# Patient Record
Sex: MALE | Race: WHITE
[De-identification: names, ages, dates, MRNs, and addresses within clinical notes are randomized per-mention and may not be internally consistent; named-entity substitution may affect disease eponyms.]

---

## 2023-04-19 ENCOUNTER — HOSPITAL ENCOUNTER (INPATIENT)
Dept: HOSPITAL 95 - ER | Age: 63
LOS: 5 days | Discharge: HOME | DRG: 896 | End: 2023-04-24
Attending: HOSPITALIST | Admitting: HOSPITALIST
Payer: COMMERCIAL

## 2023-04-19 VITALS — BODY MASS INDEX: 22.32 KG/M2 | WEIGHT: 173.94 LBS | HEIGHT: 74 IN

## 2023-04-19 DIAGNOSIS — I10: ICD-10-CM

## 2023-04-19 DIAGNOSIS — K74.60: ICD-10-CM

## 2023-04-19 DIAGNOSIS — T17.908A: ICD-10-CM

## 2023-04-19 DIAGNOSIS — K70.10: ICD-10-CM

## 2023-04-19 DIAGNOSIS — S52.612A: ICD-10-CM

## 2023-04-19 DIAGNOSIS — Z71.41: ICD-10-CM

## 2023-04-19 DIAGNOSIS — F10.239: Primary | ICD-10-CM

## 2023-04-19 DIAGNOSIS — R29.6: ICD-10-CM

## 2023-04-19 DIAGNOSIS — R56.9: ICD-10-CM

## 2023-04-19 DIAGNOSIS — S10.83XA: ICD-10-CM

## 2023-04-19 DIAGNOSIS — S60.221A: ICD-10-CM

## 2023-04-19 DIAGNOSIS — K04.7: ICD-10-CM

## 2023-04-19 DIAGNOSIS — M97.31XA: ICD-10-CM

## 2023-04-19 DIAGNOSIS — R04.0: ICD-10-CM

## 2023-04-19 DIAGNOSIS — S52.502A: ICD-10-CM

## 2023-04-19 DIAGNOSIS — F17.210: ICD-10-CM

## 2023-04-19 DIAGNOSIS — G92.8: ICD-10-CM

## 2023-04-19 DIAGNOSIS — A41.50: ICD-10-CM
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
## 2023-04-19 DIAGNOSIS — D72.819: ICD-10-CM
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
## 2023-04-19 DIAGNOSIS — S60.222A: ICD-10-CM

## 2023-04-19 DIAGNOSIS — R47.81: ICD-10-CM

## 2023-04-19 PROCEDURE — A9270 NON-COVERED ITEM OR SERVICE: HCPCS

## 2023-04-19 PROCEDURE — G0480 DRUG TEST DEF 1-7 CLASSES: HCPCS

## 2023-04-20 VITALS — DIASTOLIC BLOOD PRESSURE: 75 MMHG | SYSTOLIC BLOOD PRESSURE: 130 MMHG

## 2023-04-20 VITALS — SYSTOLIC BLOOD PRESSURE: 123 MMHG | DIASTOLIC BLOOD PRESSURE: 76 MMHG

## 2023-04-20 VITALS — DIASTOLIC BLOOD PRESSURE: 73 MMHG | SYSTOLIC BLOOD PRESSURE: 141 MMHG

## 2023-04-20 VITALS — DIASTOLIC BLOOD PRESSURE: 72 MMHG | SYSTOLIC BLOOD PRESSURE: 112 MMHG

## 2023-04-20 VITALS — SYSTOLIC BLOOD PRESSURE: 130 MMHG | DIASTOLIC BLOOD PRESSURE: 85 MMHG

## 2023-04-20 VITALS — DIASTOLIC BLOOD PRESSURE: 101 MMHG | SYSTOLIC BLOOD PRESSURE: 114 MMHG

## 2023-04-20 VITALS — SYSTOLIC BLOOD PRESSURE: 138 MMHG | DIASTOLIC BLOOD PRESSURE: 80 MMHG

## 2023-04-20 VITALS — SYSTOLIC BLOOD PRESSURE: 108 MMHG | DIASTOLIC BLOOD PRESSURE: 69 MMHG

## 2023-04-20 VITALS — SYSTOLIC BLOOD PRESSURE: 107 MMHG | DIASTOLIC BLOOD PRESSURE: 72 MMHG

## 2023-04-20 VITALS — SYSTOLIC BLOOD PRESSURE: 109 MMHG | DIASTOLIC BLOOD PRESSURE: 67 MMHG

## 2023-04-20 VITALS — SYSTOLIC BLOOD PRESSURE: 118 MMHG | DIASTOLIC BLOOD PRESSURE: 69 MMHG

## 2023-04-20 VITALS — SYSTOLIC BLOOD PRESSURE: 119 MMHG | DIASTOLIC BLOOD PRESSURE: 65 MMHG

## 2023-04-20 VITALS — SYSTOLIC BLOOD PRESSURE: 112 MMHG | DIASTOLIC BLOOD PRESSURE: 72 MMHG

## 2023-04-20 VITALS — DIASTOLIC BLOOD PRESSURE: 81 MMHG | SYSTOLIC BLOOD PRESSURE: 132 MMHG

## 2023-04-20 VITALS — DIASTOLIC BLOOD PRESSURE: 71 MMHG | SYSTOLIC BLOOD PRESSURE: 129 MMHG

## 2023-04-20 VITALS — DIASTOLIC BLOOD PRESSURE: 84 MMHG | SYSTOLIC BLOOD PRESSURE: 135 MMHG

## 2023-04-20 VITALS — SYSTOLIC BLOOD PRESSURE: 104 MMHG | DIASTOLIC BLOOD PRESSURE: 75 MMHG

## 2023-04-20 VITALS — DIASTOLIC BLOOD PRESSURE: 81 MMHG | SYSTOLIC BLOOD PRESSURE: 127 MMHG

## 2023-04-20 VITALS — DIASTOLIC BLOOD PRESSURE: 69 MMHG | SYSTOLIC BLOOD PRESSURE: 140 MMHG

## 2023-04-20 VITALS — DIASTOLIC BLOOD PRESSURE: 64 MMHG | SYSTOLIC BLOOD PRESSURE: 117 MMHG

## 2023-04-20 VITALS — DIASTOLIC BLOOD PRESSURE: 66 MMHG | SYSTOLIC BLOOD PRESSURE: 109 MMHG

## 2023-04-20 VITALS — SYSTOLIC BLOOD PRESSURE: 102 MMHG | DIASTOLIC BLOOD PRESSURE: 62 MMHG

## 2023-04-20 VITALS — SYSTOLIC BLOOD PRESSURE: 105 MMHG | DIASTOLIC BLOOD PRESSURE: 67 MMHG

## 2023-04-20 VITALS — DIASTOLIC BLOOD PRESSURE: 88 MMHG | SYSTOLIC BLOOD PRESSURE: 157 MMHG

## 2023-04-20 VITALS — SYSTOLIC BLOOD PRESSURE: 132 MMHG | DIASTOLIC BLOOD PRESSURE: 79 MMHG

## 2023-04-20 VITALS — SYSTOLIC BLOOD PRESSURE: 137 MMHG | DIASTOLIC BLOOD PRESSURE: 72 MMHG

## 2023-04-20 VITALS — DIASTOLIC BLOOD PRESSURE: 75 MMHG | SYSTOLIC BLOOD PRESSURE: 132 MMHG

## 2023-04-20 LAB
ALBUMIN SERPL BCP-MCNC: 3.1 G/DL (ref 3.4–5)
ALBUMIN/GLOB SERPL: 0.7 {RATIO} (ref 0.8–1.8)
ALT SERPL W P-5'-P-CCNC: 636 U/L (ref 12–78)
ANION GAP SERPL CALCULATED.4IONS-SCNC: 4 MMOL/L (ref 6–16)
AST SERPL W P-5'-P-CCNC: 1810 U/L (ref 12–37)
BARBITURATES UR SCN-MCNC: DETECTED NG/ML
BASOPHILS # BLD: 0.08 K/MM3 (ref 0–0.23)
BASOPHILS NFR BLD: 1 % (ref 0–2)
BENZODIAZ UR-MCNC: DETECTED UG/L
BILIRUB SERPL-MCNC: 1.4 MG/DL (ref 0.1–1)
BUN SERPL-MCNC: 12 MG/DL (ref 8–24)
CALCIUM SERPL-MCNC: 8.8 MG/DL (ref 8.5–10.1)
CHLORIDE SERPL-SCNC: 103 MMOL/L (ref 98–108)
CK SERPL-CCNC: 145 U/L (ref 39–308)
CO2 SERPL-SCNC: 25 MMOL/L (ref 21–32)
CREAT SERPL-MCNC: 0.52 MG/DL (ref 0.6–1.2)
DEPRECATED RDW RBC AUTO: 59 FL (ref 35.1–46.3)
EOSINOPHIL # BLD: 0 K/MM3 (ref 0–0.68)
EOSINOPHIL NFR BLD: 0 % (ref 0–6)
ERYTHROCYTE [DISTWIDTH] IN BLOOD BY AUTOMATED COUNT: 14.9 % (ref 11.7–14.2)
ETHANOL SERPL-MCNC: <3 MG/DL
GLOBULIN SER CALC-MCNC: 4.5 G/DL (ref 2.2–4)
GLUCOSE SERPL-MCNC: 99 MG/DL (ref 70–99)
HCT VFR BLD AUTO: 30.9 % (ref 37–53)
HGB BLD-MCNC: 10.3 G/DL (ref 13.5–17.5)
LYMPHOCYTES # BLD: 0.41 K/MM3 (ref 0.84–5.2)
LYMPHOCYTES NFR BLD: 5 % (ref 21–46)
MAGNESIUM SERPL-MCNC: 1.8 MG/DL (ref 1.6–2.4)
MCHC RBC AUTO-ENTMCNC: 33.3 G/DL (ref 31.5–36.5)
MCV RBC AUTO: 109 FL (ref 80–100)
MONOCYTES # BLD: 0.08 K/MM3 (ref 0.16–1.47)
MONOCYTES NFR BLD: 1 % (ref 4–13)
NEUTS SEG # BLD MANUAL: 7.69 K/MM3 (ref 1.96–9.15)
NEUTS SEG NFR BLD MANUAL: 93 % (ref 41–73)
NRBC # BLD AUTO: 0 K/MM3 (ref 0–0.02)
NRBC BLD AUTO-RTO: 0 /100 WBC (ref 0–0.2)
OXYCODONE UR-MCNC: DETECTED NG/ML
PLATELET # BLD AUTO: 110 K/MM3 (ref 150–400)
POTASSIUM SERPL-SCNC: 4.1 MMOL/L (ref 3.5–5.5)
PROT SERPL-MCNC: 7.6 G/DL (ref 6.4–8.2)
PROT UR STRIP-MCNC: (no result) MG/DL
PROTHROMBIN TIME: 11 SEC (ref 9.7–11.5)
RBC #/AREA URNS HPF: (no result) /HPF (ref 0–2)
SODIUM SERPL-SCNC: 132 MMOL/L (ref 136–145)
SP GR SPEC: 1.01 (ref 1–1.02)
TOTAL CELLS COUNTED BLD: 100
UROBILINOGEN UR STRIP-MCNC: (no result) MG/DL

## 2023-04-20 PROCEDURE — 2W3AX1Z IMMOBILIZATION OF RIGHT UPPER ARM USING SPLINT: ICD-10-PCS | Performed by: EMERGENCY MEDICINE

## 2023-04-20 PROCEDURE — 2W3FX1Z IMMOBILIZATION OF LEFT HAND USING SPLINT: ICD-10-PCS | Performed by: EMERGENCY MEDICINE

## 2023-04-20 PROCEDURE — 3E03329 INTRODUCTION OF OTHER ANTI-INFECTIVE INTO PERIPHERAL VEIN, PERCUTANEOUS APPROACH: ICD-10-PCS | Performed by: HOSPITALIST

## 2023-04-20 PROCEDURE — HZ2ZZZZ DETOXIFICATION SERVICES FOR SUBSTANCE ABUSE TREATMENT: ICD-10-PCS | Performed by: HOSPITALIST

## 2023-04-20 NOTE — NUR
SHIFT SUMMARY
PT A&OX 3. FOLLOWS COMMANDS. CONTINUES TO HAVE GARBLED SPEECH THAT IS
DIFFICULT TO UNDERSTAND BUT ANSWERS QUESTIONS APPROPRIATELY. PRECEDEX D/C'D.
CIWA 5-8 THIS SHIFT. ORTHO CONSULT COMPLETE. SLING PLACED TO RIGHT ARM.
TOLERATING DIET WELL. WILL CONTINUE TO MONITOR UNTIL REPORT TO ONCOMING NURSE.

## 2023-04-20 NOTE — NUR
ARRIVAL TO ICU/END OF SHIFT SUMMARY
PT ARRIVED TO ICU 11 AT 0405 VIA ED BED AND TRANSFERED OVER TO ICU BED VIA
SLIDE SHEET. HERE IN ICU D/T FALLING AT ADAPT WHILE GOING THROUGH ETOH
WITHDRAWLS AND IS ON A PRECEDEX GTT. HE IS REACTIVE TO VERBAL STIMULI AND
ORIENTED TO SELF; SLURRED SPEECH AND MUMBLES NOTED; CANNOT HOLD A
CONVERSATIONS WITH PT; CIWA 16; RESTLESS AT TIMES, PRN ATIVAN GIVEN ONCE;
PRECEDEX INFUSING AT 0.2MCG/KG/HR. SPO2 >98% ON RA. TEMP ON ARRIVAL 101.9, FAN
PLACED, TEMP NOW 99.8. HR . BP STABLE, 'S. CONDOM CATH PLACED D/T
EPISODES OF INCONTINENCE; UA SENT. D/T FALLS, PT HAS A VARIETY OF BRUISES AT
VARIES HEALING STAGES; BRUISES NOTED ON RUE, STERNUM, CHEST, RT ABD/TRUNK,
CHIN, NECK, LT EYE, NOSE (WHICH IS ALSO BROKEN) AND LT WRIST. LT WRIST IS
BROKEN AND IN SOFT SPLINT FROM ED. RT SHOULDER IS VERY SWOLLEN AND BRUISED,
SCAN SHOWED THE SHOULDER REFRACTURING. WILL REPORT TO AM RN WHEN AVAILABLE.

## 2023-04-20 NOTE — NUR
ASSUMED CARE
BEDSIDE REPORT FROM LAURA GONZALEZ AT 0700. PT RESTING IN BED. PRECEDEX GTT
INFUSING FOR ETOH W/D. PT WAKES c VERBAL STIMULI. MUMBLES INCOHERANTLY. UNABLE
TO ANSWER QUESTIONS. FOLLOWS SIMPLE COMMANDS. RETURNS TO SLEEP WHEN
UNDISTURBED. SR, RATE 70-80'S ON MONITOR. BP STABLE. LUNGS DIM IN BASES, RA,
O2 SATS >94%. SNORING RESP. ABD ROUND, SOFT, NON TENDER. BT X 4. CONDOM CATH
IN PLACE D/T INCONTINENCE. PT HAS SIGNIFICANT BRUISING TO RIGHT SHOULDER,
RIGHT TRUNK/HIP, BILATERAL EYES, RIGHT JAW, AND STERNUM. SEE PHOTOS IN CHART.
U/S AT BEDSIDE. WILL CONTINUE TO MONITOR.

## 2023-04-20 NOTE — NUR
Pt. is resting but responds when I enter the room. Pt. is intially guarded,
and displays evidence of being somnolent.  Attempt to build rapport. Pt.
requests a Rosary.  Prayed with Pt. and then provided him with a rosary. Pt.
verbalized gratitude for the visit and welcomed this  to return.

## 2023-04-21 VITALS — DIASTOLIC BLOOD PRESSURE: 62 MMHG | SYSTOLIC BLOOD PRESSURE: 101 MMHG

## 2023-04-21 VITALS — DIASTOLIC BLOOD PRESSURE: 71 MMHG | SYSTOLIC BLOOD PRESSURE: 118 MMHG

## 2023-04-21 VITALS — SYSTOLIC BLOOD PRESSURE: 114 MMHG | DIASTOLIC BLOOD PRESSURE: 60 MMHG

## 2023-04-21 VITALS — SYSTOLIC BLOOD PRESSURE: 116 MMHG | DIASTOLIC BLOOD PRESSURE: 71 MMHG

## 2023-04-21 VITALS — SYSTOLIC BLOOD PRESSURE: 100 MMHG | DIASTOLIC BLOOD PRESSURE: 62 MMHG

## 2023-04-21 VITALS — DIASTOLIC BLOOD PRESSURE: 75 MMHG | SYSTOLIC BLOOD PRESSURE: 133 MMHG

## 2023-04-21 VITALS — DIASTOLIC BLOOD PRESSURE: 85 MMHG | SYSTOLIC BLOOD PRESSURE: 100 MMHG

## 2023-04-21 VITALS — DIASTOLIC BLOOD PRESSURE: 72 MMHG | SYSTOLIC BLOOD PRESSURE: 108 MMHG

## 2023-04-21 VITALS — SYSTOLIC BLOOD PRESSURE: 109 MMHG | DIASTOLIC BLOOD PRESSURE: 60 MMHG

## 2023-04-21 VITALS — DIASTOLIC BLOOD PRESSURE: 77 MMHG | SYSTOLIC BLOOD PRESSURE: 120 MMHG

## 2023-04-21 VITALS — DIASTOLIC BLOOD PRESSURE: 69 MMHG | SYSTOLIC BLOOD PRESSURE: 112 MMHG

## 2023-04-21 LAB
ALBUMIN SERPL BCP-MCNC: 2.8 G/DL (ref 3.4–5)
ALBUMIN/GLOB SERPL: 0.7 {RATIO} (ref 0.8–1.8)
ALT SERPL W P-5'-P-CCNC: 338 U/L (ref 12–78)
ANION GAP SERPL CALCULATED.4IONS-SCNC: 5 MMOL/L (ref 6–16)
AST SERPL W P-5'-P-CCNC: 485 U/L (ref 12–37)
BILIRUB SERPL-MCNC: 0.7 MG/DL (ref 0.1–1)
BUN SERPL-MCNC: 10 MG/DL (ref 8–24)
CALCIUM SERPL-MCNC: 8 MG/DL (ref 8.5–10.1)
CHLORIDE SERPL-SCNC: 104 MMOL/L (ref 98–108)
CO2 SERPL-SCNC: 23 MMOL/L (ref 21–32)
CREAT SERPL-MCNC: 0.55 MG/DL (ref 0.6–1.2)
DEPRECATED RDW RBC AUTO: 56.6 FL (ref 35.1–46.3)
ERYTHROCYTE [DISTWIDTH] IN BLOOD BY AUTOMATED COUNT: 14.6 % (ref 11.7–14.2)
GLOBULIN SER CALC-MCNC: 3.9 G/DL (ref 2.2–4)
GLUCOSE SERPL-MCNC: 105 MG/DL (ref 70–99)
HCT VFR BLD AUTO: 27.6 % (ref 37–53)
HGB BLD-MCNC: 9.2 G/DL (ref 13.5–17.5)
MAGNESIUM SERPL-MCNC: 1.9 MG/DL (ref 1.6–2.4)
MCHC RBC AUTO-ENTMCNC: 33.3 G/DL (ref 31.5–36.5)
MCV RBC AUTO: 108 FL (ref 80–100)
NRBC # BLD AUTO: 0 K/MM3 (ref 0–0.02)
NRBC BLD AUTO-RTO: 0 /100 WBC (ref 0–0.2)
PHOSPHATE SERPL-MCNC: 3.1 MG/DL (ref 2.5–4.9)
PLATELET # BLD AUTO: 75 K/MM3 (ref 150–400)
POTASSIUM SERPL-SCNC: 3.9 MMOL/L (ref 3.5–5.5)
PROT SERPL-MCNC: 6.7 G/DL (ref 6.4–8.2)
SODIUM SERPL-SCNC: 132 MMOL/L (ref 136–145)

## 2023-04-21 NOTE — NUR
Pt. is resting but responds when I enter the room. Pt. is pleasant and
verbalizes gratitude for the rosary that he received yesterday. Re-established
rapport. Pt. displayed evidence of somnolence, but displayed an agreeable
spirit. Prayed with Pt. Pt. verbalized gratitude for the spiritual care visit.

## 2023-04-21 NOTE — NUR
SHIFT SUMMARY
 
PT CONTINUES TO REST IN BED. UP TO BEDSIDE COMODE WITH WALKER AND ASSISTANCE.
OCCASIONALLY INCONTINENT, ATTENDS IN PLACE. PT ANSWERS MOST QUESTIONS
APPROPRIATELY AND FOLLOWS COMMANDS. PT COMPLAINS OF PAIN, MEDICATED PER EMAR.
BRUISING TO RIGHT SHOULDER, ARM, CHIN AND NECK REMAINS UNCHANGED. SLING
REMAINS IN PLACE ON RIGHT ARM, SOFT SPLINT REMAINS IN PLACE ON LEFT WRIST.
HR 70-90'S, NSR, BP STABLE. NO ACUTE EVENTS THIS SHIFT. WILL CONTINUE TO
MONITOR AND GIVE REPORT TO ONCOMING RN.

## 2023-04-21 NOTE — NUR
ASSUMED CARE. PT MORE ALERT AND ORIENTED TODAY. FOLLOWING COMMANDS. ABLE TO
USE CALL LIGHT AND MAKE NEEDS KNOWN. CROSSROADS DROPPED OFF HIS BELONGINGS. HE
WAS SEARCHING FOR HIS HEARING AIDS, THEY WERE NOT IN HIS BELONGINGS. STATES
PAIN IS 9/10 IN SHOULDER. SWELLING GONE DOWN IN LIP AND CHIN, ABLE TO SPEAK
MORE CLEARLY. GOOD APPETITE, BM TODAY PER PATIENT. URINAL EMPTIED. DENIES ANY
NEEDS. SEE ASSESSMENT FOR FURTHER DETAILS.

## 2023-04-22 VITALS — DIASTOLIC BLOOD PRESSURE: 86 MMHG | SYSTOLIC BLOOD PRESSURE: 133 MMHG

## 2023-04-22 VITALS — SYSTOLIC BLOOD PRESSURE: 139 MMHG | DIASTOLIC BLOOD PRESSURE: 79 MMHG

## 2023-04-22 VITALS — SYSTOLIC BLOOD PRESSURE: 126 MMHG | DIASTOLIC BLOOD PRESSURE: 83 MMHG

## 2023-04-22 VITALS — SYSTOLIC BLOOD PRESSURE: 140 MMHG | DIASTOLIC BLOOD PRESSURE: 83 MMHG

## 2023-04-22 VITALS — DIASTOLIC BLOOD PRESSURE: 78 MMHG | SYSTOLIC BLOOD PRESSURE: 110 MMHG

## 2023-04-22 LAB
ANION GAP SERPL CALCULATED.4IONS-SCNC: 2 MMOL/L (ref 6–16)
BUN SERPL-MCNC: 11 MG/DL (ref 8–24)
CALCIUM SERPL-MCNC: 8.1 MG/DL (ref 8.5–10.1)
CHLORIDE SERPL-SCNC: 104 MMOL/L (ref 98–108)
CO2 SERPL-SCNC: 26 MMOL/L (ref 21–32)
CREAT SERPL-MCNC: 0.63 MG/DL (ref 0.6–1.2)
DEPRECATED RDW RBC AUTO: 56.6 FL (ref 35.1–46.3)
ERYTHROCYTE [DISTWIDTH] IN BLOOD BY AUTOMATED COUNT: 14.5 % (ref 11.7–14.2)
GLUCOSE SERPL-MCNC: 95 MG/DL (ref 70–99)
HCT VFR BLD AUTO: 28.8 % (ref 37–53)
HGB BLD-MCNC: 9.5 G/DL (ref 13.5–17.5)
MCHC RBC AUTO-ENTMCNC: 33 G/DL (ref 31.5–36.5)
MCV RBC AUTO: 109 FL (ref 80–100)
NRBC # BLD AUTO: 0 K/MM3 (ref 0–0.02)
NRBC BLD AUTO-RTO: 0 /100 WBC (ref 0–0.2)
PLATELET # BLD AUTO: 77 K/MM3 (ref 150–400)
POTASSIUM SERPL-SCNC: 3.6 MMOL/L (ref 3.5–5.5)
SODIUM SERPL-SCNC: 132 MMOL/L (ref 136–145)

## 2023-04-22 NOTE — NUR
PT IS A/OX3, PLEASANT AND COOPERATIVE. THE PT IS UP WITH MINIMAL ASSIST TO THE
CHAIR AND TO THE BATHROOM. PT WAS UP AND AMBULATED IN THE [facility] TODAY WITH THE
PHYSICAL THERAPIST. THE PT WAS MEDICATED FOR PAIN T/O THE DAY. PT APPEARS TO
BE BREATHING EASILY ON RA AT THIS TIME. THE PT HAS HAD FREQUANT EPTAXIA TODAY.
CALL LIGHT IN REACH. WILL CONTINUE TO MONITOR AND ASSESS FOR CHANGES.

## 2023-04-22 NOTE — NUR
PT TRANSFERED AFTER MD FROM ICU. PT PLEASANT, ORIENTED TO ROOM. PT ASKING
ABOUT PAIN MEDICATION AND AVAILABILTY. WENT OVER MAR WITH PT, EXPLAINING WHEN
MEDICATION IS AVAILABLE AND APPROPRIATE DOSING. PT HAD BLODDY NOSE WHILE
SLEEPING, CLOTTED ON OWN. PT STATES THIS IS NOT UNUSUAL.

## 2023-04-23 VITALS — DIASTOLIC BLOOD PRESSURE: 84 MMHG | SYSTOLIC BLOOD PRESSURE: 150 MMHG

## 2023-04-23 VITALS — DIASTOLIC BLOOD PRESSURE: 79 MMHG | SYSTOLIC BLOOD PRESSURE: 153 MMHG

## 2023-04-23 VITALS — DIASTOLIC BLOOD PRESSURE: 63 MMHG | SYSTOLIC BLOOD PRESSURE: 113 MMHG

## 2023-04-23 VITALS — SYSTOLIC BLOOD PRESSURE: 132 MMHG | DIASTOLIC BLOOD PRESSURE: 78 MMHG

## 2023-04-23 LAB
ANION GAP SERPL CALCULATED.4IONS-SCNC: 4 MMOL/L (ref 6–16)
BUN SERPL-MCNC: 11 MG/DL (ref 8–24)
CALCIUM SERPL-MCNC: 8.4 MG/DL (ref 8.5–10.1)
CHLORIDE SERPL-SCNC: 104 MMOL/L (ref 98–108)
CO2 SERPL-SCNC: 25 MMOL/L (ref 21–32)
CREAT SERPL-MCNC: 0.58 MG/DL (ref 0.6–1.2)
DEPRECATED RDW RBC AUTO: 55.8 FL (ref 35.1–46.3)
ERYTHROCYTE [DISTWIDTH] IN BLOOD BY AUTOMATED COUNT: 14.4 % (ref 11.7–14.2)
GLUCOSE SERPL-MCNC: 94 MG/DL (ref 70–99)
HCT VFR BLD AUTO: 29.4 % (ref 37–53)
HGB BLD-MCNC: 9.6 G/DL (ref 13.5–17.5)
MCHC RBC AUTO-ENTMCNC: 32.7 G/DL (ref 31.5–36.5)
MCV RBC AUTO: 108 FL (ref 80–100)
NRBC # BLD AUTO: 0 K/MM3 (ref 0–0.02)
NRBC BLD AUTO-RTO: 0 /100 WBC (ref 0–0.2)
PLATELET # BLD AUTO: 86 K/MM3 (ref 150–400)
POTASSIUM SERPL-SCNC: 3.7 MMOL/L (ref 3.5–5.5)
SODIUM SERPL-SCNC: 133 MMOL/L (ref 136–145)

## 2023-04-23 NOTE — NUR
PT IS A/OX4, PLEASANT AND COOPERATIVE. THE PT IS UP WITH MINIMAL ASSIST. THE
PT WAS UP IN THE CHAIR TODAY FOR BREAKFAST AND LUNCH. THE PT WAS MEDICATED FOR
PAIN T/O THE SHIFT TODAY. SINCE AROUND 1200 TODAY THE PT'S EPITAXISIS HAS
STOPPED. PT APPEARS TO BE BREATHING EASILY ON RA AT THIS TIME. CALL LIGHT IN
REACH. RIGHT ARM IN A SLING. CALL LIGHT IN REACH. WILL CONTINUE TO MONITOR AND
ASSESS FOR CHANGES

## 2023-04-24 VITALS — DIASTOLIC BLOOD PRESSURE: 91 MMHG | SYSTOLIC BLOOD PRESSURE: 163 MMHG

## 2023-04-24 VITALS — SYSTOLIC BLOOD PRESSURE: 140 MMHG | DIASTOLIC BLOOD PRESSURE: 79 MMHG

## 2023-04-24 VITALS — SYSTOLIC BLOOD PRESSURE: 150 MMHG | DIASTOLIC BLOOD PRESSURE: 82 MMHG

## 2023-04-24 LAB
BASOPHILS # BLD AUTO: 0.03 K/MM3 (ref 0–0.23)
BASOPHILS NFR BLD AUTO: 1 % (ref 0–2)
DEPRECATED RDW RBC AUTO: 56.6 FL (ref 35.1–46.3)
EOSINOPHIL # BLD AUTO: 0.1 K/MM3 (ref 0–0.68)
EOSINOPHIL NFR BLD AUTO: 2 % (ref 0–6)
ERYTHROCYTE [DISTWIDTH] IN BLOOD BY AUTOMATED COUNT: 14.3 % (ref 11.7–14.2)
HCT VFR BLD AUTO: 27 % (ref 37–53)
HGB BLD-MCNC: 9 G/DL (ref 13.5–17.5)
IMM GRANULOCYTES # BLD AUTO: 0.01 K/MM3 (ref 0–0.1)
IMM GRANULOCYTES NFR BLD AUTO: 0 % (ref 0–1)
LYMPHOCYTES # BLD AUTO: 1.59 K/MM3 (ref 0.84–5.2)
LYMPHOCYTES NFR BLD AUTO: 36 % (ref 21–46)
MCHC RBC AUTO-ENTMCNC: 33.3 G/DL (ref 31.5–36.5)
MCV RBC AUTO: 108 FL (ref 80–100)
MONOCYTES # BLD AUTO: 1.13 K/MM3 (ref 0.16–1.47)
MONOCYTES NFR BLD AUTO: 26 % (ref 4–13)
NEUTROPHILS # BLD AUTO: 1.56 K/MM3 (ref 1.96–9.15)
NEUTROPHILS NFR BLD AUTO: 35 % (ref 41–73)
NRBC # BLD AUTO: 0 K/MM3 (ref 0–0.02)
NRBC BLD AUTO-RTO: 0 /100 WBC (ref 0–0.2)
PLATELET # BLD AUTO: 111 K/MM3 (ref 150–400)

## 2023-04-24 NOTE — NUR
DISCHARGE SUMMARY
DISCHARGE, FOLLOWUP, AND MEDICATION INSTRUCTIONS GIVEN TO PT. PT VOICED
COMPLETED UNDERSTANDING AND HAS NO QUESTIONS AT THIS TIME. IV REMOVED WITH
CATHETER TIP INTACT. TELE BOX REMOVED AND RETURN TO MONITOR. PT RIDE SCHEDULED
FOR 1630, WILL CONTINUE TO MONITOR. CALL LIGHT WITHIN REACH.